# Patient Record
Sex: FEMALE | Race: WHITE | NOT HISPANIC OR LATINO | URBAN - METROPOLITAN AREA
[De-identification: names, ages, dates, MRNs, and addresses within clinical notes are randomized per-mention and may not be internally consistent; named-entity substitution may affect disease eponyms.]

---

## 2018-04-17 ENCOUNTER — OUTPATIENT (OUTPATIENT)
Dept: OUTPATIENT SERVICES | Facility: HOSPITAL | Age: 73
LOS: 1 days | End: 2018-04-17
Payer: COMMERCIAL

## 2018-04-17 DIAGNOSIS — R07.9 CHEST PAIN, UNSPECIFIED: ICD-10-CM

## 2018-04-17 DIAGNOSIS — R55 SYNCOPE AND COLLAPSE: ICD-10-CM

## 2018-04-17 PROCEDURE — 93306 TTE W/DOPPLER COMPLETE: CPT | Mod: 26,59

## 2018-04-17 PROCEDURE — 93016 CV STRESS TEST SUPVJ ONLY: CPT

## 2018-04-17 PROCEDURE — 93325 DOPPLER ECHO COLOR FLOW MAPG: CPT | Mod: 26,59

## 2018-04-17 PROCEDURE — 93306 TTE W/DOPPLER COMPLETE: CPT

## 2018-04-17 PROCEDURE — 93321 DOPPLER ECHO F-UP/LMTD STD: CPT | Mod: 26,59

## 2018-04-17 PROCEDURE — 93350 STRESS TTE ONLY: CPT | Mod: 26

## 2018-04-17 PROCEDURE — 93351 STRESS TTE COMPLETE: CPT

## 2018-04-17 PROCEDURE — 93018 CV STRESS TEST I&R ONLY: CPT

## 2019-11-07 ENCOUNTER — APPOINTMENT (OUTPATIENT)
Dept: NEUROLOGY | Facility: CLINIC | Age: 74
End: 2019-11-07
Payer: COMMERCIAL

## 2019-11-07 VITALS
OXYGEN SATURATION: 97 % | SYSTOLIC BLOOD PRESSURE: 133 MMHG | BODY MASS INDEX: 21.66 KG/M2 | HEART RATE: 80 BPM | HEIGHT: 65 IN | WEIGHT: 130 LBS | DIASTOLIC BLOOD PRESSURE: 68 MMHG

## 2019-11-07 DIAGNOSIS — G25.0 ESSENTIAL TREMOR: ICD-10-CM

## 2019-11-07 DIAGNOSIS — R25.1 TREMOR, UNSPECIFIED: ICD-10-CM

## 2019-11-07 PROCEDURE — 99204 OFFICE O/P NEW MOD 45 MIN: CPT

## 2019-12-19 ENCOUNTER — APPOINTMENT (OUTPATIENT)
Dept: NEUROLOGY | Facility: CLINIC | Age: 74
End: 2019-12-19

## 2020-01-23 ENCOUNTER — APPOINTMENT (OUTPATIENT)
Dept: NEUROLOGY | Facility: CLINIC | Age: 75
End: 2020-01-23
Payer: COMMERCIAL

## 2020-01-23 VITALS
DIASTOLIC BLOOD PRESSURE: 68 MMHG | HEIGHT: 65 IN | OXYGEN SATURATION: 99 % | HEART RATE: 63 BPM | TEMPERATURE: 98 F | BODY MASS INDEX: 21.83 KG/M2 | WEIGHT: 131 LBS | SYSTOLIC BLOOD PRESSURE: 117 MMHG

## 2020-01-23 PROCEDURE — 99214 OFFICE O/P EST MOD 30 MIN: CPT

## 2020-06-04 ENCOUNTER — APPOINTMENT (OUTPATIENT)
Dept: NEUROLOGY | Facility: CLINIC | Age: 75
End: 2020-06-04
Payer: COMMERCIAL

## 2020-06-04 PROCEDURE — 99213 OFFICE O/P EST LOW 20 MIN: CPT | Mod: 95

## 2020-06-04 RX ORDER — PROPRANOLOL HYDROCHLORIDE 20 MG/1
20 TABLET ORAL
Qty: 60 | Refills: 4 | Status: DISCONTINUED | COMMUNITY
Start: 2020-01-23 | End: 2020-06-04

## 2020-08-04 ENCOUNTER — TRANSCRIPTION ENCOUNTER (OUTPATIENT)
Age: 75
End: 2020-08-04

## 2020-10-01 ENCOUNTER — APPOINTMENT (OUTPATIENT)
Dept: NEUROLOGY | Facility: CLINIC | Age: 75
End: 2020-10-01
Payer: COMMERCIAL

## 2020-10-01 PROCEDURE — 99213 OFFICE O/P EST LOW 20 MIN: CPT | Mod: 95

## 2020-11-20 ENCOUNTER — APPOINTMENT (OUTPATIENT)
Dept: CT IMAGING | Facility: HOSPITAL | Age: 75
End: 2020-11-20

## 2020-11-20 ENCOUNTER — OUTPATIENT (OUTPATIENT)
Dept: OUTPATIENT SERVICES | Facility: HOSPITAL | Age: 75
LOS: 1 days | End: 2020-11-20
Payer: COMMERCIAL

## 2020-11-20 PROCEDURE — 75574 CT ANGIO HRT W/3D IMAGE: CPT

## 2020-11-20 PROCEDURE — 75574 CT ANGIO HRT W/3D IMAGE: CPT | Mod: 26

## 2020-12-09 ENCOUNTER — APPOINTMENT (OUTPATIENT)
Dept: NEUROLOGY | Facility: CLINIC | Age: 75
End: 2020-12-09
Payer: COMMERCIAL

## 2020-12-09 VITALS
DIASTOLIC BLOOD PRESSURE: 78 MMHG | HEART RATE: 73 BPM | WEIGHT: 129 LBS | SYSTOLIC BLOOD PRESSURE: 137 MMHG | OXYGEN SATURATION: 97 % | TEMPERATURE: 97.3 F | BODY MASS INDEX: 21.49 KG/M2 | HEIGHT: 65 IN

## 2020-12-09 PROCEDURE — 99213 OFFICE O/P EST LOW 20 MIN: CPT

## 2020-12-09 PROCEDURE — 99072 ADDL SUPL MATRL&STAF TM PHE: CPT

## 2020-12-09 RX ORDER — PRIMIDONE 50 MG/1
50 TABLET ORAL
Qty: 90 | Refills: 5 | Status: DISCONTINUED | COMMUNITY
Start: 2020-10-02 | End: 2020-12-09

## 2021-02-26 ENCOUNTER — RX RENEWAL (OUTPATIENT)
Age: 76
End: 2021-02-26

## 2021-03-10 ENCOUNTER — APPOINTMENT (OUTPATIENT)
Dept: NEUROLOGY | Facility: CLINIC | Age: 76
End: 2021-03-10
Payer: COMMERCIAL

## 2021-03-10 VITALS
BODY MASS INDEX: 21.49 KG/M2 | OXYGEN SATURATION: 95 % | HEIGHT: 65 IN | SYSTOLIC BLOOD PRESSURE: 131 MMHG | TEMPERATURE: 97.3 F | HEART RATE: 69 BPM | WEIGHT: 129 LBS | DIASTOLIC BLOOD PRESSURE: 66 MMHG

## 2021-03-10 PROCEDURE — 99213 OFFICE O/P EST LOW 20 MIN: CPT

## 2021-03-10 PROCEDURE — 99072 ADDL SUPL MATRL&STAF TM PHE: CPT

## 2021-04-22 ENCOUNTER — APPOINTMENT (OUTPATIENT)
Dept: OTOLARYNGOLOGY | Facility: CLINIC | Age: 76
End: 2021-04-22
Payer: COMMERCIAL

## 2021-04-22 PROCEDURE — 92524 BEHAVRAL QUALIT ANALYS VOICE: CPT | Mod: GN

## 2021-04-22 PROCEDURE — 99072 ADDL SUPL MATRL&STAF TM PHE: CPT

## 2021-04-22 PROCEDURE — 31579 LARYNGOSCOPY TELESCOPIC: CPT

## 2021-05-04 ENCOUNTER — APPOINTMENT (OUTPATIENT)
Dept: OTOLARYNGOLOGY | Facility: CLINIC | Age: 76
End: 2021-05-04
Payer: COMMERCIAL

## 2021-05-04 PROCEDURE — 99072 ADDL SUPL MATRL&STAF TM PHE: CPT

## 2021-05-04 PROCEDURE — 92507 TX SP LANG VOICE COMM INDIV: CPT | Mod: GN

## 2021-05-11 ENCOUNTER — APPOINTMENT (OUTPATIENT)
Dept: OTOLARYNGOLOGY | Facility: CLINIC | Age: 76
End: 2021-05-11
Payer: COMMERCIAL

## 2021-05-11 PROCEDURE — 99072 ADDL SUPL MATRL&STAF TM PHE: CPT

## 2021-05-11 PROCEDURE — 92507 TX SP LANG VOICE COMM INDIV: CPT | Mod: GN

## 2021-05-25 ENCOUNTER — APPOINTMENT (OUTPATIENT)
Dept: OTOLARYNGOLOGY | Facility: CLINIC | Age: 76
End: 2021-05-25

## 2021-06-17 ENCOUNTER — APPOINTMENT (OUTPATIENT)
Dept: NEUROLOGY | Facility: CLINIC | Age: 76
End: 2021-06-17
Payer: COMMERCIAL

## 2021-06-17 VITALS
WEIGHT: 120 LBS | HEART RATE: 63 BPM | BODY MASS INDEX: 19.99 KG/M2 | DIASTOLIC BLOOD PRESSURE: 66 MMHG | TEMPERATURE: 98.3 F | OXYGEN SATURATION: 98 % | SYSTOLIC BLOOD PRESSURE: 104 MMHG | RESPIRATION RATE: 14 BRPM | HEIGHT: 65 IN

## 2021-06-17 PROCEDURE — 99214 OFFICE O/P EST MOD 30 MIN: CPT

## 2021-06-17 PROCEDURE — 99072 ADDL SUPL MATRL&STAF TM PHE: CPT

## 2021-09-30 ENCOUNTER — TRANSCRIPTION ENCOUNTER (OUTPATIENT)
Age: 76
End: 2021-09-30

## 2021-10-20 ENCOUNTER — APPOINTMENT (OUTPATIENT)
Dept: NEUROLOGY | Facility: CLINIC | Age: 76
End: 2021-10-20
Payer: COMMERCIAL

## 2021-10-20 VITALS
OXYGEN SATURATION: 94 % | DIASTOLIC BLOOD PRESSURE: 61 MMHG | HEIGHT: 65 IN | SYSTOLIC BLOOD PRESSURE: 104 MMHG | HEART RATE: 96 BPM | WEIGHT: 112 LBS | RESPIRATION RATE: 14 BRPM | TEMPERATURE: 98.4 F | BODY MASS INDEX: 18.66 KG/M2

## 2021-10-20 PROCEDURE — 99214 OFFICE O/P EST MOD 30 MIN: CPT

## 2021-11-10 ENCOUNTER — APPOINTMENT (OUTPATIENT)
Dept: OTOLARYNGOLOGY | Facility: CLINIC | Age: 76
End: 2021-11-10
Payer: COMMERCIAL

## 2021-11-10 VITALS — HEIGHT: 65 IN | WEIGHT: 112 LBS | TEMPERATURE: 97.2 F | BODY MASS INDEX: 18.66 KG/M2

## 2021-11-10 DIAGNOSIS — Z83.79 FAMILY HISTORY OF OTHER DISEASES OF THE DIGESTIVE SYSTEM: ICD-10-CM

## 2021-11-10 DIAGNOSIS — Z85.820 PERSONAL HISTORY OF MALIGNANT MELANOMA OF SKIN: ICD-10-CM

## 2021-11-10 DIAGNOSIS — Z87.39 PERSONAL HISTORY OF OTHER DISEASES OF THE MUSCULOSKELETAL SYSTEM AND CONNECTIVE TISSUE: ICD-10-CM

## 2021-11-10 DIAGNOSIS — Z87.898 PERSONAL HISTORY OF OTHER SPECIFIED CONDITIONS: ICD-10-CM

## 2021-11-10 DIAGNOSIS — H61.21 IMPACTED CERUMEN, RIGHT EAR: ICD-10-CM

## 2021-11-10 DIAGNOSIS — Z87.19 PERSONAL HISTORY OF OTHER DISEASES OF THE DIGESTIVE SYSTEM: ICD-10-CM

## 2021-11-10 DIAGNOSIS — Z86.69 PERSONAL HISTORY OF OTHER DISEASES OF THE NERVOUS SYSTEM AND SENSE ORGANS: ICD-10-CM

## 2021-11-10 DIAGNOSIS — Z82.49 FAMILY HISTORY OF ISCHEMIC HEART DISEASE AND OTHER DISEASES OF THE CIRCULATORY SYSTEM: ICD-10-CM

## 2021-11-10 DIAGNOSIS — Z80.0 FAMILY HISTORY OF MALIGNANT NEOPLASM OF DIGESTIVE ORGANS: ICD-10-CM

## 2021-11-10 DIAGNOSIS — Z78.9 OTHER SPECIFIED HEALTH STATUS: ICD-10-CM

## 2021-11-10 DIAGNOSIS — Z86.39 PERSONAL HISTORY OF OTHER ENDOCRINE, NUTRITIONAL AND METABOLIC DISEASE: ICD-10-CM

## 2021-11-10 DIAGNOSIS — R51.9 HEADACHE, UNSPECIFIED: ICD-10-CM

## 2021-11-10 DIAGNOSIS — Z87.09 PERSONAL HISTORY OF OTHER DISEASES OF THE RESPIRATORY SYSTEM: ICD-10-CM

## 2021-11-10 DIAGNOSIS — Z80.1 FAMILY HISTORY OF MALIGNANT NEOPLASM OF TRACHEA, BRONCHUS AND LUNG: ICD-10-CM

## 2021-11-10 PROCEDURE — 99214 OFFICE O/P EST MOD 30 MIN: CPT | Mod: 25

## 2021-11-10 PROCEDURE — 69210 REMOVE IMPACTED EAR WAX UNI: CPT

## 2021-11-10 PROCEDURE — 31231 NASAL ENDOSCOPY DX: CPT

## 2021-11-10 NOTE — CONSULT LETTER
[Dear  ___] : Dear  [unfilled], [Consult Letter:] : I had the pleasure of evaluating your patient, [unfilled]. [Please see my note below.] : Please see my note below. [Consult Closing:] : Thank you very much for allowing me to participate in the care of this patient.  If you have any questions, please do not hesitate to contact me. [Sincerely,] : Sincerely, [FreeTextEntry3] : Stacy Bucio MD\par

## 2021-11-10 NOTE — HISTORY OF PRESENT ILLNESS
[de-identified] : ZULEMA SHEPARD is a 75 year patient With a history of chronic rhinitis and reflux. She is here with itching, sneezing, nasal drainage, postnasal drip, and itchy watery eyes. The left side of her nose is worse. She also had an episode of bleeding from the left side of her nose on Monday night. She occasionally has left retro-orbital pain. 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 She uses Flonase and Claritin as needed. She said allergy testing showed mild dust and pollen allergies. She may have had immunotherapy. She has no history of a nasal fracture but did have cauterization of polyps as a child (she thinks on the left side of her nose). She said her reflux is under control. She had speech therapy this year

## 2021-11-10 NOTE — ASSESSMENT
[FreeTextEntry1] : She has a history of chronic rhinitis. She has mild allergies but may also have vasomotor rhinitis. She does have a deviated septum on exam and partial left inferior and middle turbinate resection. I did not see an obvious sinus infection. There was no congestion or purulent drainage. There was no active bleeding on exam today. I do not see an obvious source\par \par She does suffer from reflux. She said a well-controlled.\par \par Cerumen on the right was removed\par \par PLAN\par \par -findings and management options discussed in detail with the patient. \par -Nasal saline spray and antihistamine/decongestant as needed\par -flonase as needed\par -trial of astelin and Atrovent nasal spray\par -avoid nasal manipulation. \par -humdifier and moisturizing nasal gel\par -If she has recurrent bleeding from the nose, I would like to see her so I can locate a source for cauterization\par -Reflux precautions and medication (OTC pepcid) as needed\par -follow up in 3-4 weeks. If she continues to suffer from left retro-orbital pain, we will discuss obtaining a CT scan. I am going to try and look at her MRI that she had done in 2019 to see if the sinuses were imaged\par -call and return earlier if any concerns. \par

## 2021-12-08 ENCOUNTER — APPOINTMENT (OUTPATIENT)
Dept: OTOLARYNGOLOGY | Facility: CLINIC | Age: 76
End: 2021-12-08
Payer: COMMERCIAL

## 2021-12-08 VITALS — BODY MASS INDEX: 18.33 KG/M2 | HEIGHT: 65 IN | WEIGHT: 110 LBS | TEMPERATURE: 97.2 F

## 2021-12-08 PROCEDURE — 99213 OFFICE O/P EST LOW 20 MIN: CPT

## 2021-12-08 RX ORDER — AZELASTINE HYDROCHLORIDE 137 UG/1
0.1 SPRAY, METERED NASAL TWICE DAILY
Qty: 1 | Refills: 3 | Status: DISCONTINUED | COMMUNITY
Start: 2021-11-10 | End: 2021-12-08

## 2021-12-08 NOTE — ASSESSMENT
[FreeTextEntry1] : She has a history of chronic rhinitis due to allergies and vasomotor rhinitis. She has had improvement in her symptoms with Atrovent.  She feels that she is still aware of the left sinuses but the pain is better. She continues to have itching in the left nose. It is unclear if it is related to the partial turbinate resections. \par \par She has reflux\par \par PLAN\par \par -findings and management options discussed in detail with the patient. \par -Nasal saline spray and antihistamine/decongestant as needed\par -flonase as needed\par -moisturizing nasal gel prn\par -she may increase the Atrovent to tid prn.  We could also consider a higher dose. She may consider the clarifix procedure since Atrovent is helping. I spoke with her other risks/benefits of this. She is going to think over\par -she would like to go for the CT scan of the sinuses. \par -Reflux precautions and medication (OTC pepcid) as needed\par -follow up after the CT scan. \par -call and return earlier if any problems. \par

## 2021-12-08 NOTE — HISTORY OF PRESENT ILLNESS
[de-identified] : ZULEMA SHEPARD is a 75 year patient Here for followup for chronic rhinitis and reflux. She is feeling better since her last visit. She still has itching in the left side of her nose and says she is aware of the sinuses on that side. The pain has improved. She tried Astelin nasal spray but it made her feel worse. Ipratropium helps. She is using moisturizing nasal gel and takes a low dose of Claritin. She said that the drainage has also improved. She said her reflux is well controlled.\par \par ENT history\par she has a history of allergies. She had testing in the past which was positive to dust and pollen. She had immunotherapy\par She had "cauterization of nasal polyps" as a child- on exam she had resection of the left inferior and middle turbinates.\par She has reflux.  \par She had TMJD in the past.

## 2021-12-08 NOTE — CONSULT LETTER
[Dear  ___] : Dear  [unfilled], [Please see my note below.] : Please see my note below. [Consult Closing:] : Thank you very much for allowing me to participate in the care of this patient.  If you have any questions, please do not hesitate to contact me. [Sincerely,] : Sincerely, [Courtesy Letter:] : I had the pleasure of seeing your patient, [unfilled], in my office today. [FreeTextEntry3] : Stacy Bucio MD\par

## 2021-12-17 ENCOUNTER — RX RENEWAL (OUTPATIENT)
Age: 76
End: 2021-12-17

## 2022-01-04 ENCOUNTER — NON-APPOINTMENT (OUTPATIENT)
Age: 77
End: 2022-01-04

## 2022-01-04 ENCOUNTER — APPOINTMENT (OUTPATIENT)
Dept: CT IMAGING | Facility: HOSPITAL | Age: 77
End: 2022-01-04
Payer: COMMERCIAL

## 2022-01-04 ENCOUNTER — OUTPATIENT (OUTPATIENT)
Dept: OUTPATIENT SERVICES | Facility: HOSPITAL | Age: 77
LOS: 1 days | End: 2022-01-04
Payer: COMMERCIAL

## 2022-01-04 PROCEDURE — 70486 CT MAXILLOFACIAL W/O DYE: CPT

## 2022-01-04 PROCEDURE — 70486 CT MAXILLOFACIAL W/O DYE: CPT | Mod: 26

## 2022-01-10 ENCOUNTER — APPOINTMENT (OUTPATIENT)
Dept: OTOLARYNGOLOGY | Facility: CLINIC | Age: 77
End: 2022-01-10
Payer: COMMERCIAL

## 2022-01-10 DIAGNOSIS — K21.9 GASTRO-ESOPHAGEAL REFLUX DISEASE W/OUT ESOPHAGITIS: ICD-10-CM

## 2022-01-10 DIAGNOSIS — J31.0 CHRONIC RHINITIS: ICD-10-CM

## 2022-01-10 DIAGNOSIS — J34.2 DEVIATED NASAL SEPTUM: ICD-10-CM

## 2022-01-10 PROCEDURE — 99213 OFFICE O/P EST LOW 20 MIN: CPT

## 2022-01-10 NOTE — HISTORY OF PRESENT ILLNESS
[de-identified] : ZULEMA SHEPARD is a 76 year patient Here for follow up. She had been having itching in the nose. She said as the temperature decreased. it improved. She has intermittent nasal congestion and drainage but no longer has sinus pain or pressure. She had exacerbation of her allergies after she cleaned a bookcase. She also had some chest congestion as well. She uses Atrovent as needed. She is unable to use Astelin as she had an adverse reaction. She has tried Flonase in the past. CT scan showed clear sinuses. She did have a deviated septum.\par \par ENT history\par she has a history of allergies. She had testing in the past which was positive to dust and pollen. She had immunotherapy\par She had "cauterization of nasal polyps" as a child- on exam she had resection of the left inferior and middle turbinates.\par She has reflux. \par She had TMJD in the past.

## 2022-01-10 NOTE — CONSULT LETTER
[Dear  ___] : Dear  [unfilled], [Courtesy Letter:] : I had the pleasure of seeing your patient, [unfilled], in my office today. [Please see my note below.] : Please see my note below. [Consult Closing:] : Thank you very much for allowing me to participate in the care of this patient.  If you have any questions, please do not hesitate to contact me. [Sincerely,] : Sincerely, [FreeTextEntry3] : Stacy Bucio MD\par

## 2022-01-10 NOTE — ASSESSMENT
[FreeTextEntry1] : She has history of chronic allergic rhinitis and vasomotor rhinitis. She had allergy exacerbation after being exposed to dust. CT scan shows clear sinuses and a deviated septum.\par \par She has reflux.\par She is TMJ dysfunction\par \par PLAN\par \par -findings and management options discussed in detail with the patient. \par -Nasal saline spray and antihistamine/decongestant as needed\par -flonase and Atrovent as needed\par -moisturizing nasal gel prn\par -She has an appointment to see the allergist\par -allergy precautions. she should consider wearing a mask when cleaning. \par -We have discussed the clarifix procedure. We will consider that\par -Reflux precautions and medication  as needed\par -treatment of TMJD\par -I recommended she speak with her PCP about her pulmonary symptoms\par -I asked her to check in with me after she sees an allergist. If she is doing well, I will see her back in 3 months\par -call and return earlier if any problems. \par

## 2022-01-18 ENCOUNTER — APPOINTMENT (OUTPATIENT)
Dept: NEUROLOGY | Facility: CLINIC | Age: 77
End: 2022-01-18
Payer: COMMERCIAL

## 2022-01-18 PROCEDURE — 99213 OFFICE O/P EST LOW 20 MIN: CPT | Mod: 95

## 2022-02-25 ENCOUNTER — RESULT REVIEW (OUTPATIENT)
Age: 77
End: 2022-02-25

## 2022-02-25 ENCOUNTER — APPOINTMENT (OUTPATIENT)
Dept: CT IMAGING | Facility: HOSPITAL | Age: 77
End: 2022-02-25

## 2022-02-25 ENCOUNTER — OUTPATIENT (OUTPATIENT)
Dept: OUTPATIENT SERVICES | Facility: HOSPITAL | Age: 77
LOS: 1 days | End: 2022-02-25
Payer: COMMERCIAL

## 2022-02-25 PROCEDURE — 88342 IMHCHEM/IMCYTCHM 1ST ANTB: CPT | Mod: 26,59

## 2022-02-25 PROCEDURE — 88305 TISSUE EXAM BY PATHOLOGIST: CPT | Mod: 26

## 2022-02-25 PROCEDURE — 88173 CYTOPATH EVAL FNA REPORT: CPT | Mod: 26

## 2022-02-25 PROCEDURE — 71045 X-RAY EXAM CHEST 1 VIEW: CPT | Mod: 26

## 2022-02-25 PROCEDURE — 71045 X-RAY EXAM CHEST 1 VIEW: CPT

## 2022-02-25 PROCEDURE — 88305 TISSUE EXAM BY PATHOLOGIST: CPT

## 2022-02-25 PROCEDURE — 88341 IMHCHEM/IMCYTCHM EA ADD ANTB: CPT

## 2022-02-25 PROCEDURE — 32408 CORE NDL BX LNG/MED PERQ: CPT

## 2022-02-25 PROCEDURE — C1769: CPT

## 2022-02-25 PROCEDURE — 88344 IMHCHEM/IMCYTCHM EA MLT ANTB: CPT | Mod: 26

## 2022-02-25 PROCEDURE — 88173 CYTOPATH EVAL FNA REPORT: CPT

## 2022-02-25 PROCEDURE — 88344 IMHCHEM/IMCYTCHM EA MLT ANTB: CPT

## 2022-02-28 LAB — NON-GYNECOLOGICAL CYTOLOGY STUDY: SIGNIFICANT CHANGE UP

## 2022-03-03 ENCOUNTER — NON-APPOINTMENT (OUTPATIENT)
Age: 77
End: 2022-03-03

## 2022-03-03 ENCOUNTER — APPOINTMENT (OUTPATIENT)
Dept: THORACIC SURGERY | Facility: CLINIC | Age: 77
End: 2022-03-03
Payer: COMMERCIAL

## 2022-03-03 VITALS
SYSTOLIC BLOOD PRESSURE: 137 MMHG | BODY MASS INDEX: 17.99 KG/M2 | DIASTOLIC BLOOD PRESSURE: 62 MMHG | OXYGEN SATURATION: 97 % | HEIGHT: 65 IN | TEMPERATURE: 96.2 F | RESPIRATION RATE: 17 BRPM | WEIGHT: 108 LBS | HEART RATE: 75 BPM

## 2022-03-03 DIAGNOSIS — R63.4 ABNORMAL WEIGHT LOSS: ICD-10-CM

## 2022-03-03 DIAGNOSIS — C34.90 MALIGNANT NEOPLASM OF UNSPECIFIED PART OF UNSPECIFIED BRONCHUS OR LUNG: ICD-10-CM

## 2022-03-03 DIAGNOSIS — R05.9 COUGH, UNSPECIFIED: ICD-10-CM

## 2022-03-03 DIAGNOSIS — R91.8 OTHER NONSPECIFIC ABNORMAL FINDING OF LUNG FIELD: ICD-10-CM

## 2022-03-03 PROCEDURE — 99204 OFFICE O/P NEW MOD 45 MIN: CPT

## 2022-03-03 NOTE — PHYSICAL EXAM
[General Appearance - Alert] : alert [General Appearance - In No Acute Distress] : in no acute distress [General Appearance - Well Nourished] : well nourished [Neck Appearance] : the appearance of the neck was normal [Neck Cervical Mass (___cm)] : no neck mass was observed [Respiration, Rhythm And Depth] : normal respiratory rhythm and effort [Exaggerated Use Of Accessory Muscles For Inspiration] : no accessory muscle use [Examination Of The Chest] : the chest was normal in appearance [Abnormal Walk] : normal gait [Musculoskeletal - Swelling] : no joint swelling seen [Skin Color & Pigmentation] : normal skin color and pigmentation [Skin Turgor] : normal skin turgor [] : no rash [Oriented To Time, Place, And Person] : oriented to person, place, and time [Impaired Insight] : insight and judgment were intact [Affect] : the affect was normal

## 2022-03-03 NOTE — REVIEW OF SYSTEMS
[Recent Weight Loss (___ Lbs)] : recent [unfilled] ~Ulb weight loss [Cough] : cough [As Noted in HPI] : as noted in HPI [Negative] : Endocrine

## 2022-03-06 NOTE — ASSESSMENT
[FreeTextEntry1] : 75 y/o female, never smoker, presents today with a biopsy proven left upper lobe non small cell lung cancer.  She has a PHMx of GERD, chronic rhinitis, IBS, osteoporosis, essential tremor (propanol). CT chest is concerning for chest wall invasion as well as an enlarged subcarinal and enlarged AP window lymph node. She presents today for an initial evaluation. She is referred by Dr. Nina Joe.\par \par Today the patient reports 22 lb unintentional weight loss x 1 year. She admits to a dry cough that has been getting worse. No hemoptysis. She has developed left sided back and chest pain that radiates down her arm that started in December of 2021. She denies headaches, nausea and vomiting. Does have a history of tremor, initially evaluated in November of 2019. \par \par CT chest completed on 02/03/22:\par -centrally necrotic masslike area of density in the left upper lobe periphery with pleural involvement, suspicion of intercostal soft tissue involvement\par -while the finding may represent an atypical or fungal infection such as actinomycosis, the possibility of necrotic neoplasm should be strongly considered\par -tissue sampling is strongly suggested\par -there is a suggestion of mildly enlarge lymph node in the left hilum\par \par CT guided biopsy of the FILIPE 2/25/22:\par - positive for malignant cells (Non-small cell carcinoma). \par \par Above CT scan and pathology reviewed with the patient and her brother who was on the phone. With the diagnosis established, I discussed the importance of staging the cancer with the patient and her brother. We do not know the optimal treatment at this point.  \par \par I will order a PET/CT scan and MRI brain. I will discuss the results of the scan with the patient in clinic and we will determine the next steps.\par \par PLAN: \par 1. PET/CT scan\par 2. MRI brain\par 3. RTC to discuss results and next steps

## 2022-03-06 NOTE — END OF VISIT
[FreeTextEntry3] : I, CRYSTAL CARTER , am scribing for and in the presence of KHRIS CHRISTIANSEN the following sections: history of present illness, past medical/family/surgical/family/social history, review of systems, vital signs, physical exam, and disposition.\par \par

## 2022-03-06 NOTE — CONSULT LETTER
[Dear  ___] : Dear  [unfilled], [Consult Letter:] : I had the pleasure of evaluating your patient, [unfilled]. [Please see my note below.] : Please see my note below. [Consult Closing:] : Thank you very much for allowing me to participate in the care of this patient.  If you have any questions, please do not hesitate to contact me. [Sincerely,] : Sincerely, [FreeTextEntry3] : Dr. Bharat Williamson\par

## 2022-03-06 NOTE — HISTORY OF PRESENT ILLNESS
[FreeTextEntry1] : 77 y/o female, never smoker, with a PHMx of GERD, chronic rhinitis, IBS, osteoporosis, essential tremor (propanol), herpes, PNA 1/2022 with cough and left sided chest pain. CT chest revealing centrally necrotic masslike density in the left upper lobe with suspicious of soft tissue involvement. Subsequent CT guided biopsy of FIILPE on 2/25/22 revealed Non-small cell carcinoma. She presents today for an initial evaluation. She is referred by Dr. Nina Joe.\par \par CT chest completed on 02/03/22:\par -centrally necrotic masslike area of density in the left upper lobe periphery with pleural involvement, suspicion of intercostal soft tissue involvement\par -while the finding may represent an atypical or fungal infection such as actinomycosis, the possibility of necrotic neoplasm should be strongly considered\par -tissue sampling is strongly suggested\par -there is a suggestion of mildly enlarge lymph node in the left hilum\par \par CT guided biopsy of the FILIPE 2/25/22:\par - positive for malignant cells (Non-small cell carcinoma).

## 2022-03-08 ENCOUNTER — OUTPATIENT (OUTPATIENT)
Dept: OUTPATIENT SERVICES | Facility: HOSPITAL | Age: 77
LOS: 1 days | End: 2022-03-08
Payer: COMMERCIAL

## 2022-03-08 LAB — GLUCOSE BLDC GLUCOMTR-MCNC: 84 MG/DL — SIGNIFICANT CHANGE UP (ref 70–99)

## 2022-03-08 PROCEDURE — 78815 PET IMAGE W/CT SKULL-THIGH: CPT

## 2022-03-08 PROCEDURE — 78815 PET IMAGE W/CT SKULL-THIGH: CPT | Mod: 26,PI

## 2022-03-08 PROCEDURE — A9552: CPT

## 2022-03-08 PROCEDURE — 82962 GLUCOSE BLOOD TEST: CPT

## 2022-03-10 ENCOUNTER — APPOINTMENT (OUTPATIENT)
Dept: MRI IMAGING | Facility: HOSPITAL | Age: 77
End: 2022-03-10

## 2022-03-10 ENCOUNTER — NON-APPOINTMENT (OUTPATIENT)
Age: 77
End: 2022-03-10

## 2022-03-10 ENCOUNTER — OUTPATIENT (OUTPATIENT)
Dept: OUTPATIENT SERVICES | Facility: HOSPITAL | Age: 77
LOS: 1 days | End: 2022-03-10
Payer: COMMERCIAL

## 2022-03-10 DIAGNOSIS — E27.8 OTHER SPECIFIED DISORDERS OF ADRENAL GLAND: ICD-10-CM

## 2022-03-10 PROCEDURE — 70553 MRI BRAIN STEM W/O & W/DYE: CPT

## 2022-03-10 PROCEDURE — A9585: CPT

## 2022-03-10 PROCEDURE — 70553 MRI BRAIN STEM W/O & W/DYE: CPT | Mod: 26

## 2022-03-17 ENCOUNTER — APPOINTMENT (OUTPATIENT)
Dept: THORACIC SURGERY | Facility: CLINIC | Age: 77
End: 2022-03-17

## 2022-03-17 ENCOUNTER — OUTPATIENT (OUTPATIENT)
Dept: OUTPATIENT SERVICES | Facility: HOSPITAL | Age: 77
LOS: 1 days | End: 2022-03-17
Payer: COMMERCIAL

## 2022-03-17 ENCOUNTER — APPOINTMENT (OUTPATIENT)
Dept: MRI IMAGING | Facility: HOSPITAL | Age: 77
End: 2022-03-17

## 2022-03-17 PROCEDURE — A9585: CPT

## 2022-03-17 PROCEDURE — 74183 MRI ABD W/O CNTR FLWD CNTR: CPT | Mod: 26

## 2022-03-17 PROCEDURE — 74183 MRI ABD W/O CNTR FLWD CNTR: CPT

## 2022-03-21 ENCOUNTER — APPOINTMENT (OUTPATIENT)
Dept: THORACIC SURGERY | Facility: CLINIC | Age: 77
End: 2022-03-21
Payer: COMMERCIAL

## 2022-03-21 PROCEDURE — 99442: CPT

## 2022-03-22 NOTE — REASON FOR VISIT
[Home] : at home, [unfilled] , at the time of the visit. [Medical Office: ([facility])___] : at the medical office located in  [Follow-Up: _____] : a [unfilled] follow-up visit

## 2022-03-22 NOTE — ASSESSMENT
[FreeTextEntry1] : 75 y/o female, never smoker, with a PHMx of GERD, chronic rhinitis, IBS, osteoporosis, essential tremor (propanol), herpes, PNA 1/2022 with cough and left sided chest pain. CT chest revealing centrally necrotic masslike density in the left upper lobe with suspicious of soft tissue involvement. Subsequent CT guided biopsy of FILIPE on 2/25/22 revealed Non-small cell carcinoma. She presents today to review PEt scan, MRI brain and MRI abdomen. She is referred by Dr. Nina Joe.\par \par Today the patient  admits to increased left arm and chest wall pain. She is not taking medication for this. \par \par PEt scan done on 3/8/22: was reviewed which revealed:\par -FDG avid left upper lobe mass corresponding to patient's known malignancy. Additional foci of FDG uptake within the left perihilar region which may reflect left hilar lymph nodes versus additional nodules. Evaluation is limited secondary to motion on the CT images. Mild FDG avid AP window lymph node which may represent josephine metastasis versus reactive changes.\par -FDG avid left thyroid lobe nodule, recommend dedicated thyroid ultrasound for further evaluation.\par -Mild FDG uptake within a left adrenal gland nodule which is nonspecific and may reflect benign etiology such as adenoma although further evaluation with dedicated CT or MR adrenal protocol is recommended.\par \par MRi head done on 3/9/22: was reviewed which revealed:  \par - No intracranial metastases. Minimum small vessel ischemic disease without acute ischemia.\par \par MRI abdomen done on 3/17/22: was reviewed which revealed:\par - No adrenal nodule/mass.\par \par Above imaging reviewed with patient. Clinical staging is T3 chest wall invasion (Symptomatic) N2 (AP window), Stage IIIb. Brain MRI negative, abdominal MRI negative (concern for adrenal met on PET). She’s frail and her pain is progressing. Explained that a Stage IIIB tumor is generally unresectable. Will refer her to radiation/oncology for treatment. Will present her post treatment scan at our thoracic tumor board.\par \par PLAN:\par 1. Refer to Oncology/ Radiation\par \par \par

## 2022-03-22 NOTE — HISTORY OF PRESENT ILLNESS
[FreeTextEntry1] : 77 y/o female, never smoker, with a PHMx of GERD, chronic rhinitis, IBS, osteoporosis, essential tremor (propanol), herpes, PNA 1/2022 with cough and left sided chest pain. CT chest revealing centrally necrotic masslike density in the left upper lobe with suspicious of soft tissue involvement. Subsequent CT guided biopsy of FILIPE on 2/25/22 revealed Non-small cell carcinoma. She presents today to review PEt scan, MRI brain and MRI abdomen. She is referred by Dr. Nina Joe.\par \par CT chest completed on 02/03/22:\par -centrally necrotic masslike area of density in the left upper lobe periphery with pleural involvement, suspicion of intercostal soft tissue involvement\par -while the finding may represent an atypical or fungal infection such as actinomycosis, the possibility of necrotic neoplasm should be strongly considered\par -tissue sampling is strongly suggested\par -there is a suggestion of mildly enlarge lymph node in the left hilum\par \par CT guided biopsy of the FILIPE 2/25/22:\par - positive for malignant cells (Non-small cell carcinoma). \par \par PEt scan done on 3/8/22:\par -FDG avid left upper lobe mass corresponding to patient's known malignancy. Additional foci of FDG uptake within the left perihilar region which may reflect left hilar lymph nodes versus additional nodules. Evaluation is limited secondary to motion on the CT images. Mild FDG avid AP window lymph node which may represent josephine metastasis versus reactive changes.\par -FDG avid left thyroid lobe nodule, recommend dedicated thyroid ultrasound for further evaluation.\par -Mild FDG uptake within a left adrenal gland nodule which is nonspecific and may reflect benign etiology such as adenoma although further evaluation with dedicated CT or MR adrenal protocol is recommended.\par \par MRi head done on 3/9/22:\par - No intracranial metastases. Minimum small vessel ischemic disease without acute ischemia.\par \par MRI abdomen done on 3/17/22:\par - No adrenal nodule/mass.\par \par \par \par \par \par

## 2022-03-23 ENCOUNTER — TRANSCRIPTION ENCOUNTER (OUTPATIENT)
Age: 77
End: 2022-03-23

## 2022-03-30 NOTE — HISTORY OF PRESENT ILLNESS
[FreeTextEntry1] : MS.NANCY SHEPARD is a 76 year old ???right/left handed female with recent diagnosis of NON-SMALL CELL CARCINOMA OF LEFT LUNG - UPPER LOBE WITH FEATURES OF SQUAMOUS CELL CARCINOMA. (size)\par Her last menstrual cycle was _________.\par \par BRIEF HISTORY: Tremors now for a few yrs. Saw Neuro 5 yrs ago. He thought BET due to mother's hx. Bothered her then, but has gotten a lot worse. Left hand worse than right. Vocal cords affected. Typing is more challenging. Some head bobbing. Worse with action. Is able to do power yoga, has not lost strength. \par \par REFERRED BY: \par \par SURGERY: ???????????\par \par OCCUPATION: \par \par ACTIVE PROBLEMS:\par Cough (786.2) (R05.9)\par Mass of upper lobe of left lung (786.6) (R91.8)\par Weight loss, unintentional (783.21) (R63.4)\par \par PMH:\par History of arthritis (V13.4) (Z87.39)\par History of benign essential tremor (V12.49) (Z86.69)\par History of chronic pain (V13.89) (Z87.898)\par History of chronic rhinitis (V12.69) (Z87.09)\par History of gastroesophageal reflux (GERD) (V12.79) (Z87.19)\par History of hyperthyroidism (V12.29) (Z86.39)\par History of Skin cancer (melanoma) (V10.82) (Z85.820)\par History of TMJ disorder (V13.59) (Z87.39)\par \par PSH:\par History of Cataract Surgery\par History of Eye surgery\par History of Shoulder Surgery\par \par FH:\par Family history of celiac disease (V18.59) (Z83.79) : Mother\par Family history of hypertension (V17.49) (Z82.49) : Mother\par Family history of lung cancer (V16.1) (Z80.1) : Mother\par Family history of malignant neoplasm of colon (V16.0) (Z80.0) : Mother\par \par ALLERGIES:Azelastine HCl SOLN, Mold, Pollen\par \par SYMPTOMS- PTOSIS:\par                    - BLURRED VISION:\par                    - DIPLOPIA:\par                   -  LAZY EYE:\par                    - FACIAL DROOP:\par                    - HEADACHES:\par                    -  DIZZINESS:\par                    -  ALTERATION IN TASTE:\par                    -  LOSS OF APPETITE/WEIGHT LOSS:\par                    -  DYSPHAGIA:\par                     - NAUSEA:\par                     - VOMITING:\par                     -  BALANCE:\par                     -  LIP NUMBNESS/PINS AND NEEDLES IN SIDE OF TONGUE:\par                     -  DYSPNEA:\par                     -  TRISMUS:\par                     -  VOICE HOARSENESS:\par                     -  PATIENT SCOPED:\par \par \par PATHOLOGY ON 02/25/2022: \par Final Diagnosis\par LUNG, LEFT UPPER , FNA: POSITIVE FOR MALIGNANT CELLS.\par Non-small cell carcinoma.\par \par Final Diagnosis\par Lung, left upper lobe, core biopsy:\par -Non-small cell carcinoma with features of squamous cell carcinoma.\par \par Note: Immunostains performed on block 1C shows that the tumor cells are positive for P40 and negative for TTF-1, Napsin A. This staining profile supports the diagnosis. PDL1 stain pending. Case was reviewed intradepartmentally (DG).\par \par \par MRI SCAN HEAD ON 03/10/2022:\par IMPRESSION: No intracranial metastases. Minimum small vessel ischemic disease without acute ischemia.\par \par \par \par

## 2022-03-31 ENCOUNTER — APPOINTMENT (OUTPATIENT)
Dept: RADIATION ONCOLOGY | Facility: CLINIC | Age: 77
End: 2022-03-31

## 2022-07-19 ENCOUNTER — APPOINTMENT (OUTPATIENT)
Dept: NEUROLOGY | Facility: CLINIC | Age: 77
End: 2022-07-19

## 2022-07-19 VITALS
DIASTOLIC BLOOD PRESSURE: 59 MMHG | OXYGEN SATURATION: 98 % | BODY MASS INDEX: 17.16 KG/M2 | HEART RATE: 73 BPM | TEMPERATURE: 97.3 F | WEIGHT: 103 LBS | SYSTOLIC BLOOD PRESSURE: 96 MMHG | HEIGHT: 65 IN

## 2022-07-19 VITALS — HEART RATE: 73 BPM | DIASTOLIC BLOOD PRESSURE: 63 MMHG | OXYGEN SATURATION: 98 % | SYSTOLIC BLOOD PRESSURE: 97 MMHG

## 2022-07-19 PROCEDURE — 99213 OFFICE O/P EST LOW 20 MIN: CPT

## 2023-02-07 ENCOUNTER — APPOINTMENT (OUTPATIENT)
Dept: NEUROLOGY | Facility: CLINIC | Age: 78
End: 2023-02-07
Payer: COMMERCIAL

## 2023-02-07 VITALS — HEART RATE: 82 BPM | DIASTOLIC BLOOD PRESSURE: 65 MMHG | SYSTOLIC BLOOD PRESSURE: 110 MMHG

## 2023-02-07 PROCEDURE — 99213 OFFICE O/P EST LOW 20 MIN: CPT

## 2023-03-30 ENCOUNTER — APPOINTMENT (OUTPATIENT)
Dept: NEUROLOGY | Facility: CLINIC | Age: 78
End: 2023-03-30
Payer: COMMERCIAL

## 2023-03-30 VITALS
SYSTOLIC BLOOD PRESSURE: 127 MMHG | WEIGHT: 106 LBS | DIASTOLIC BLOOD PRESSURE: 71 MMHG | TEMPERATURE: 95.3 F | HEART RATE: 84 BPM | OXYGEN SATURATION: 98 % | BODY MASS INDEX: 17.64 KG/M2

## 2023-03-30 VITALS — HEART RATE: 82 BPM | DIASTOLIC BLOOD PRESSURE: 58 MMHG | SYSTOLIC BLOOD PRESSURE: 106 MMHG

## 2023-03-30 PROCEDURE — 99215 OFFICE O/P EST HI 40 MIN: CPT

## 2023-03-31 RX ORDER — ALPRAZOLAM 0.5 MG/1
0.5 TABLET ORAL
Qty: 60 | Refills: 0 | Status: ACTIVE | COMMUNITY
Start: 2023-03-30 | End: 1900-01-01

## 2023-05-09 ENCOUNTER — NON-APPOINTMENT (OUTPATIENT)
Age: 78
End: 2023-05-09

## 2023-07-07 ENCOUNTER — APPOINTMENT (OUTPATIENT)
Dept: NEUROLOGY | Facility: CLINIC | Age: 78
End: 2023-07-07
Payer: COMMERCIAL

## 2023-07-07 VITALS
SYSTOLIC BLOOD PRESSURE: 101 MMHG | HEART RATE: 84 BPM | WEIGHT: 104 LBS | OXYGEN SATURATION: 95 % | BODY MASS INDEX: 17.31 KG/M2 | DIASTOLIC BLOOD PRESSURE: 55 MMHG

## 2023-07-07 VITALS
HEIGHT: 65 IN | HEART RATE: 78 BPM | SYSTOLIC BLOOD PRESSURE: 104 MMHG | TEMPERATURE: 97.2 F | OXYGEN SATURATION: 95 % | DIASTOLIC BLOOD PRESSURE: 57 MMHG

## 2023-07-07 PROCEDURE — 99215 OFFICE O/P EST HI 40 MIN: CPT

## 2023-07-07 RX ORDER — FLUTICASONE PROPIONATE 50 MCG
SPRAY, SUSPENSION NASAL
Refills: 0 | Status: DISCONTINUED | COMMUNITY
End: 2023-07-07

## 2023-07-07 RX ORDER — PSEUDOEPHEDRINE HCL 30 MG
30 TABLET ORAL
Refills: 0 | Status: ACTIVE | COMMUNITY
Start: 2023-07-07

## 2023-07-07 RX ORDER — PROPRANOLOL HYDROCHLORIDE 10 MG/1
10 TABLET ORAL DAILY
Qty: 75 | Refills: 11 | Status: DISCONTINUED | COMMUNITY
Start: 2019-11-07 | End: 2023-07-07

## 2023-07-07 RX ORDER — ONDANSETRON 4 MG/1
4 TABLET ORAL
Refills: 0 | Status: ACTIVE | COMMUNITY
Start: 2023-07-07

## 2023-07-07 RX ORDER — LANSOPRAZOLE 15 MG/1
15 CAPSULE, DELAYED RELEASE ORAL
Refills: 0 | Status: ACTIVE | COMMUNITY
Start: 2023-07-07

## 2023-07-07 RX ORDER — GABAPENTIN 100 MG/1
100 CAPSULE ORAL
Qty: 90 | Refills: 1 | Status: ACTIVE | COMMUNITY
Start: 2023-07-07

## 2023-07-07 RX ORDER — IBUPROFEN 200 MG/1
TABLET, COATED ORAL
Refills: 0 | Status: DISCONTINUED | COMMUNITY
End: 2023-07-07

## 2023-07-07 RX ORDER — LORATADINE 5 MG/5 ML
SOLUTION, ORAL ORAL
Refills: 0 | Status: DISCONTINUED | COMMUNITY
End: 2023-07-07

## 2023-07-07 RX ORDER — VALACYCLOVIR 500 MG/1
TABLET, FILM COATED ORAL
Refills: 0 | Status: DISCONTINUED | COMMUNITY
End: 2023-07-07

## 2023-07-07 RX ORDER — CRIZOTINIB 200 MG/1
200 CAPSULE ORAL
Refills: 0 | Status: ACTIVE | COMMUNITY
Start: 2023-07-07

## 2023-07-07 RX ORDER — CLOBETASOL PROPIONATE 0.5 MG/G
0.05 CREAM TOPICAL
Refills: 0 | Status: DISCONTINUED | COMMUNITY
End: 2023-07-07

## 2023-07-07 RX ORDER — METOPROLOL TARTRATE 25 MG/1
25 TABLET, FILM COATED ORAL DAILY
Refills: 0 | Status: ACTIVE | COMMUNITY

## 2023-07-07 RX ORDER — GUAIFENESIN 200 MG 200 MG/1
200 TABLET ORAL
Refills: 0 | Status: ACTIVE | COMMUNITY
Start: 2023-07-07

## 2023-07-07 RX ORDER — IPRATROPIUM BROMIDE 21 UG/1
0.03 SPRAY NASAL
Qty: 1 | Refills: 3 | Status: DISCONTINUED | COMMUNITY
Start: 2021-11-10 | End: 2023-07-07

## 2023-07-07 RX ORDER — OXYCODONE 5 MG/1
5 TABLET ORAL AT BEDTIME
Refills: 0 | Status: ACTIVE | COMMUNITY
Start: 2023-07-07

## 2023-09-05 ENCOUNTER — APPOINTMENT (OUTPATIENT)
Dept: NEUROLOGY | Facility: CLINIC | Age: 78
End: 2023-09-05

## 2023-09-13 ENCOUNTER — APPOINTMENT (OUTPATIENT)
Dept: NEUROLOGY | Facility: CLINIC | Age: 78
End: 2023-09-13
Payer: COMMERCIAL

## 2023-09-13 VITALS
SYSTOLIC BLOOD PRESSURE: 130 MMHG | BODY MASS INDEX: 15.99 KG/M2 | OXYGEN SATURATION: 98 % | DIASTOLIC BLOOD PRESSURE: 71 MMHG | WEIGHT: 96 LBS | HEIGHT: 65 IN | TEMPERATURE: 97.2 F | HEART RATE: 92 BPM

## 2023-09-13 VITALS — DIASTOLIC BLOOD PRESSURE: 68 MMHG | SYSTOLIC BLOOD PRESSURE: 104 MMHG | HEART RATE: 102 BPM

## 2023-09-13 PROCEDURE — 99215 OFFICE O/P EST HI 40 MIN: CPT

## 2023-10-25 ENCOUNTER — APPOINTMENT (OUTPATIENT)
Dept: OTOLARYNGOLOGY | Facility: CLINIC | Age: 78
End: 2023-10-25

## 2024-01-16 ENCOUNTER — APPOINTMENT (OUTPATIENT)
Dept: NEUROLOGY | Facility: CLINIC | Age: 79
End: 2024-01-16
Payer: COMMERCIAL

## 2024-01-16 VITALS
OXYGEN SATURATION: 98 % | HEIGHT: 65 IN | HEART RATE: 78 BPM | SYSTOLIC BLOOD PRESSURE: 117 MMHG | WEIGHT: 101 LBS | BODY MASS INDEX: 16.83 KG/M2 | DIASTOLIC BLOOD PRESSURE: 68 MMHG | TEMPERATURE: 98.2 F

## 2024-01-16 VITALS — HEART RATE: 84 BPM | DIASTOLIC BLOOD PRESSURE: 64 MMHG | SYSTOLIC BLOOD PRESSURE: 107 MMHG

## 2024-01-16 PROCEDURE — 99215 OFFICE O/P EST HI 40 MIN: CPT

## 2024-01-16 RX ORDER — ATORVASTATIN CALCIUM 20 MG/1
20 TABLET, FILM COATED ORAL DAILY
Refills: 0 | Status: ACTIVE | COMMUNITY

## 2024-01-16 RX ORDER — ZONISAMIDE 25 MG/1
25 CAPSULE ORAL
Qty: 60 | Refills: 5 | Status: DISCONTINUED | COMMUNITY
Start: 2023-03-30 | End: 2024-01-16

## 2024-03-29 ENCOUNTER — APPOINTMENT (OUTPATIENT)
Dept: NEUROLOGY | Facility: CLINIC | Age: 79
End: 2024-03-29
Payer: COMMERCIAL

## 2024-03-29 VITALS — DIASTOLIC BLOOD PRESSURE: 67 MMHG | SYSTOLIC BLOOD PRESSURE: 105 MMHG | OXYGEN SATURATION: 99 % | HEART RATE: 100 BPM

## 2024-03-29 VITALS
OXYGEN SATURATION: 100 % | DIASTOLIC BLOOD PRESSURE: 73 MMHG | HEART RATE: 95 BPM | SYSTOLIC BLOOD PRESSURE: 119 MMHG | BODY MASS INDEX: 15.33 KG/M2 | TEMPERATURE: 97.6 F | WEIGHT: 92 LBS | HEIGHT: 65 IN

## 2024-03-29 PROCEDURE — G2211 COMPLEX E/M VISIT ADD ON: CPT

## 2024-03-29 PROCEDURE — 99215 OFFICE O/P EST HI 40 MIN: CPT

## 2024-03-29 NOTE — ASSESSMENT
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 Action type, aggravates with stresses, reduced by propranolol and alcohol, family history of ET. On exam action tremors, postural tremor noted and also mild resting component L> R hand. Spiral shows ET type, handwriting shows no micrographic. Pt had both a heart attack and fungal infection of her esophagus in May. She states it was also found that she had a small tumor in her brain; plans to reduce with radiation soon. Pt states mood is stressed due to recent health issues. She states her tremor has worsened due to these significant health conditions.  Pt was also advised to start Xanax 0.5 mg 1 tab bid as needed (Start with 1/2 tab qd to bid prn, and gradually increased to 1 pill bid as needed. Pt has not used as of yet.  she reports that there are new metastasis that have been identified, with vertebrae (2) and hips to metastasis, and possibly with lung metastasis. She is doing gene therapy, but there is a concern that the cancer has mutated and may be less responsive to gene therapy and possibly to chemotherapy. Tremor are worse with chemotherapy.  advised to try Xanax as needed around the chemo days to help with the tremor if needed.     A/Plan. Continue metoprolol 10mg ER daily monitored by the cardiologist use Xanax 0.5mg as needed for the worsening tremor  patient staffed with attending Dr.DiRocco Luz Frausto MD Movement Disorder fellow

## 2024-03-29 NOTE — HISTORY OF PRESENT ILLNESS
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 Action type, aggravates with stresses, reduced by propranolol and alcohol, family history of ET. On exam action tremors, postural tremor noted and also mild resting component L> R hand. Spiral shows ET type, handwriting shows no micrographic. Pt had both a heart attack and fungal infection of her esophagus in May. She states it was also found that she had a small tumor in her brain; plans to reduce with radiation soon. Pt states mood is stressed due to recent health issues. She states her tremor has worsened due to these significant health conditions.  Pt was also advised to start Xanax 0.5 mg 1 tab bid as needed (Start with 1/2 tab qd to bid prn, and gradually increased to 1 pill bid as needed. Pt has not used as of yet.  she reports that there are new metastasis that have been identified, with vertebrae (2) and hips to metastasis, and possibly with lung metastasis. She is doing gene therapy, but there is a concern that the cancer has mutated and may be less responsive to gene therapy and possibly to chemotherapy. She has lost weight and lost appetite. She had Radiation to the brain for the brain metastasis, awaiting CT to determine if radiation had an effect.   Patient reports that the tremors worsen as the day progresses. Pt states the tremor worsens around 3- 4 pm when the Metoprolol "wearing OFF". Chemotherapy seems to ease the tremor. Pt states there is a slowing in speed of speech. Pt reports difficulty with swallowing pills and saliva; pt states she often coughs to clear her throat.  Medication: Metoprolol 25 mg from IR to ER; she wants to discuss with her cardiologist prior to starting.  Xanax 0.5 mg 1 tab bid as needed (Start with 1/2 tab qd to bid PRN, and gradually increased to 1 pill bid as needed, she has not tried it. Has been on PT.

## 2024-03-29 NOTE — END OF VISIT
[Time Spent: ___ minutes] : I have spent [unfilled] minutes of time on the encounter. [] : Fellow [FreeTextEntry3] : Fellow present and participated to visit, Patient's history reviewed with fellow, patient examined with fellow, assessment and plan discussed with fellow.

## 2024-03-29 NOTE — PHYSICAL EXAM
[FreeTextEntry1] : Patient with mild tremor, postural > action, affecting both hands and arms, with the left side more severely affected. There is also a very mild resting component on the left side. No tremor in the lower extremities. No head / chin / lips tremor No voice tremor. No rigidity, cogwheeling, no bradykinesia, some imprecision on tasks of motor dexterity which were worsened by tremor. No other Parkinsonian features.

## 2025-01-17 ENCOUNTER — APPOINTMENT (OUTPATIENT)
Dept: NEUROLOGY | Facility: CLINIC | Age: 80
End: 2025-01-17

## 2025-09-11 ENCOUNTER — NON-APPOINTMENT (OUTPATIENT)
Age: 80
End: 2025-09-11